# Patient Record
Sex: MALE
[De-identification: names, ages, dates, MRNs, and addresses within clinical notes are randomized per-mention and may not be internally consistent; named-entity substitution may affect disease eponyms.]

---

## 2022-11-17 LAB
PH UR: 6 [PH] (ref 5–8)
SP GR UR: 1 (ref 1–1.03)
UROBILINOGEN UR QL STRIP: <2 MG/DL (ref ?–2)

## 2022-11-17 RX ADMIN — FAMOTIDINE SCH MG: 20 TABLET, FILM COATED ORAL at 20:02

## 2022-11-17 RX ADMIN — LORAZEPAM PRN MG: 2 INJECTION INTRAMUSCULAR; INTRAVENOUS at 21:25

## 2022-11-17 RX ADMIN — DIVALPROEX SODIUM SCH MG: 500 TABLET, FILM COATED, EXTENDED RELEASE ORAL at 20:01

## 2022-11-18 RX ADMIN — NICOTINE SCH PATCH: 14 PATCH, EXTENDED RELEASE TRANSDERMAL at 08:44

## 2022-11-18 RX ADMIN — FAMOTIDINE SCH MG: 20 TABLET, FILM COATED ORAL at 19:53

## 2022-11-18 RX ADMIN — DIVALPROEX SODIUM SCH MG: 500 TABLET, FILM COATED, EXTENDED RELEASE ORAL at 19:53

## 2022-11-18 RX ADMIN — LORAZEPAM PRN MG: 2 INJECTION INTRAMUSCULAR; INTRAVENOUS at 15:47

## 2022-11-18 NOTE — P.HPIM
History of Present Illness


H&P Date: 11/18/22








History of Presenting Illness:





Patient is a very pleasant 42-year-old male with a past medical history of 

nicotine dependence, schizoaffective disorder, bipolar disorder, and cannabis 

use.  He is currently admitted to the inpatient mental health unit for 

stabilization of psychiatric symptoms resulting from manic episode.  We have 

been consulted for medical management for outpatient hospitalization.  Patient 

very agitated and exhibiting manic behaviors throughout examination.  Physical 

exam and review of systems is limited secondary to patient's yelling, outbursts,

and angry affect.  Patient did per minute physical exam of listening to her 

heart and lungs and then became irate and yelling and nursing staff intervening 

at this time.





Review of systems:





Pertinent positives and negatives as discussed in HPI, a complete review of 

systems was performed and all other systems are negative.





Physical exam:





Vital signs reviewed and stable. 


General: Nontoxic, no distress and appears stated age.  


Derm: Skin warm and dry, normal coloration for ethnicity.


Head: Atraumatic, normocephalic and symmetric.  


Eyes: EOMs intact, no lid lag, and anicteric sclera


Cardiovascular: regular rate and rhythm with normal S1S2, no murmur,


Lungs: Respirations even, regular, and unlabored on room air. Lungs CTA 

bilaterally, no rhonchi, no rales, no wheezing, and no accessory muscle usage. 


Abdominal: soft, nontender to palpation, no guarding, no appreciable 

organomegaly


Ext: ROM intact. Ambulatory with a steady gait. No lower extremity swelling.


Neuro: Rapid speech


Psych: Alert and oriented, aggressive behaviors and angry affect





Assessment and Plan of Care:





Polysubstance use


-Recommend smoking cessation as well as cessation of all cannabinoid use and 

alcohol use.





Manic episode


Schizoaffective disorder


Bipolar disorder


-Management per primary admitting psychiatric team.


-Maintain safe and supportive care.





Mitch Franco NP rendered care for this patient independently, reviewed the 

findings and plan as documented in the note above.  I did not physically speak 

with or examine the patient on this date. 





Thank you for allowing us to participate in the care of this pleasant patient.  

Do not hesitate to contact us with questions.  Someone can be reached from the 

[facility] hospitalist group all hours of the day at [phone] or via 

[employer].





Past Medical History


Past Medical History: No Reported History


History of Any Multi-Drug Resistant Organisms: None Reported


Past Surgical History: No Surgical Hx Reported


Past Anesthesia/Blood Transfusion Reactions: No Reported Reaction


Past Psychological History: Bipolar


Smoking Status: Current some day smoker


Past Drug Use History: None Reported





Medications and Allergies


                                    Allergies











Allergy/AdvReac Type Severity Reaction Status Date / Time


 


No Known Allergies Allergy   Verified 11/17/22 16:04














Physical Exam


Osteopathic Statement: *.  No significant issues noted on an osteopathic s

tructural exam other than those noted in the History and Physical/Consult.
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
## 2022-11-19 RX ADMIN — NICOTINE SCH PATCH: 14 PATCH, EXTENDED RELEASE TRANSDERMAL at 09:05

## 2022-11-19 RX ADMIN — FAMOTIDINE SCH MG: 20 TABLET, FILM COATED ORAL at 19:54

## 2022-11-19 RX ADMIN — DIVALPROEX SODIUM SCH MG: 500 TABLET, FILM COATED, EXTENDED RELEASE ORAL at 19:53

## 2022-11-19 NOTE — P.PN
Progress Note - Text


Progress Note Date: 11/19/22





Interval History:


Patient was seen wandering the hallways and was repeatedly approaching this 

provider with flight of ideas.  He is disorganized as he describes that there is

a medication change would like made.  Discussed increasing Risperdal and 

Seroquel to help him with sleep.  He was initially agreeable with this but 

approached this provider again with the same concern.  After it was clarified 

that the changes previously discussed were made, patient then began speaking 

about his outpatient provider.  This time, patient continues to be disorganized.

 At this time patient denies any suicidal or homical ideations, intent or plan. 

Patient denies any auditory, visual hallucinations and denies any paranoia or 

delusions. Patient denies any side effects from the medications and has been 

compliant with meds. 





Mental Status Exam:


General Appearance: Patient appears to be stated age is alert, however not 

directable or cooperative. Patient appears to have fair hygiene and grooming.


Behavior: Patient displays psychomotor agitation. Intense eye contact


Speech: Patient's speech is pressured, spontaneous, loud in volume.


Mood/Affect: Patient reports their mood is angry, affect is congruent and 

agitated and expansive


Suicidality/Homicidality:  Patient denies having any homicidal ideation intent 

or plan. Denies any suicidal ideations intent or plan  


Perceptions: Patient denies any visual hallucinations and denies any auditory 

hallucinations


Though content/process:  Thought process with flight of ideas. Denies SI and HI


Memory and concentration: AOX3, grossly intact for the purposes of this session.

 Concentration is grossly poor.


Judgment and insight: Grossly poor.





Assessment


Schizoaffective disorder, bipolar type





Plan:


-Patient is admitted under involuntary status to [facility] for stabilization of 

psychiatric symptoms and safety.  A second certification was completed and along

with petition will be filed for court.


-Medications : 


Risperdal increase to 2 mg daily and 3 mg by mouth twice a day for mood 

stabilization/psychosis. Additional 1 mg PO given once during daytime for 

agitation


Increase Seroquel to 400 mg by mouth at bedtime for now for sleep.  Likely taper

off this medication as her transition the patient to Risperdal perseris or 

invega sustenna


Depakote ER 1500 mg by mouth at bedtime for mood stabilization


-Ativan and Thorazine PRN for agitation/aggression


-Patient was informed of the risks, benefits and side effects of the medication 

however states that he will refuse medications.


-Internal Medicine consult to perform medical evaluation and physical.


-NRT - nicotine patch


-SW on board for discharge planning. Encourage patient to participate in groups 

to work on coping skills.

## 2022-11-20 RX ADMIN — NICOTINE SCH: 14 PATCH, EXTENDED RELEASE TRANSDERMAL at 08:20

## 2022-11-20 RX ADMIN — FAMOTIDINE SCH MG: 20 TABLET, FILM COATED ORAL at 20:39

## 2022-11-20 RX ADMIN — LORAZEPAM PRN MG: 2 INJECTION INTRAMUSCULAR; INTRAVENOUS at 03:07

## 2022-11-20 RX ADMIN — DIVALPROEX SODIUM SCH MG: 500 TABLET, FILM COATED, EXTENDED RELEASE ORAL at 20:39

## 2022-11-20 NOTE — P.PN
Progress Note - Text


Progress Note Date: 11/20/22





Interval History:


Patient was seen wandering the hallways and was repeatedly approaching this pr

ovider and staff.  He states that he had an outpatient provider who looks like 

this provider and he would like to know her name and information.  However, 

patient is unable to recall further details of this outpatient provider.  He 

reports tolerating his medications but requests that he be placed on Wellbutrin 

and that he would like to receive a "giant dose of Ambien".  He states "don't 

hold back" and requests for large doses for sleeping.  However, he reports that 

he has been sleeping well with the current medications.  Patient is more 

redirectable today but continues to be disorganized.  At this time patient 

denies any suicidal or homicidal ideations, intent or plan. Patient denies any 

auditory, visual hallucinations and denies any paranoia or delusions. Patient 

denies any side effects from the medications and has been compliant with meds. 





Mental Status Exam:


General Appearance: Patient appears to be stated age is alert, more cooperative.

Patient appears to have poor hygiene and grooming.


Behavior: Patient displays psychomotor agitation and intrusiveness


Speech: Patient's speech is pressured, spontaneous, loud in volume.


Mood/Affect: Patient reports their mood is "fine", affect is constricted


Suicidality/Homicidality:  Patient denies having any homicidal ideation intent 

or plan. Denies any suicidal ideations intent or plan  


Perceptions: Patient denies any visual hallucinations and denies any auditory 

hallucinations


Though content/process:  Thought process with flight of ideas. Denies SI and HI


Memory and concentration: AOX3, grossly intact for the purposes of this session.

 Concentration is grossly poor.


Judgment and insight: Grossly poor.





Assessment


Schizoaffective disorder, bipolar type





Plan:


-Patient is admitted under involuntary status to [facility] for stabilization of 

psychiatric symptoms and safety.  A second certification was completed and along

with petition will be filed for court.


-Medications : 


Risperdal increase to 3 mg by mouth twice a day for mood st

abilization/psychosis. 


Continue Seroquel 400 mg by mouth at bedtime for now for sleep.  Likely taper 

off this medication as her transition the patient to Risperdal perseris or 

invega sustenna


Depakote ER 1500 mg by mouth at bedtime for mood stabilization


-Ativan and Thorazine PRN for agitation/aggression


-Patient was informed of the risks, benefits and side effects of the medication 

however states that he will refuse medications.


-Internal Medicine consult to perform medical evaluation and physical.


-NRT - nicotine patch


-SW on board for discharge planning. Encourage patient to participate in groups 

to work on coping skills.

## 2022-11-21 LAB
ALBUMIN SERPL-MCNC: 3.7 G/DL (ref 3.5–5)
ALP SERPL-CCNC: 71 U/L (ref 38–126)
ALT SERPL-CCNC: 17 U/L (ref 4–49)
ANION GAP SERPL CALC-SCNC: 7 MMOL/L
AST SERPL-CCNC: 24 U/L (ref 17–59)
BASOPHILS # BLD AUTO: 0 K/UL (ref 0–0.2)
BASOPHILS NFR BLD AUTO: 1 %
BUN SERPL-SCNC: 10 MG/DL (ref 9–20)
CALCIUM SPEC-MCNC: 9.2 MG/DL (ref 8.4–10.2)
CHLORIDE SERPL-SCNC: 103 MMOL/L (ref 98–107)
CO2 SERPL-SCNC: 27 MMOL/L (ref 22–30)
EOSINOPHIL # BLD AUTO: 0.3 K/UL (ref 0–0.7)
EOSINOPHIL NFR BLD AUTO: 8 %
ERYTHROCYTE [DISTWIDTH] IN BLOOD BY AUTOMATED COUNT: 4.29 M/UL (ref 4.3–5.9)
ERYTHROCYTE [DISTWIDTH] IN BLOOD: 13.8 % (ref 11.5–15.5)
GLUCOSE SERPL-MCNC: 105 MG/DL (ref 74–99)
HCT VFR BLD AUTO: 38.8 % (ref 39–53)
HGB BLD-MCNC: 12.9 GM/DL (ref 13–17.5)
LYMPHOCYTES # SPEC AUTO: 1.2 K/UL (ref 1–4.8)
LYMPHOCYTES NFR SPEC AUTO: 31 %
MCH RBC QN AUTO: 30 PG (ref 25–35)
MCHC RBC AUTO-ENTMCNC: 33.2 G/DL (ref 31–37)
MCV RBC AUTO: 90.3 FL (ref 80–100)
MONOCYTES # BLD AUTO: 0.4 K/UL (ref 0–1)
MONOCYTES NFR BLD AUTO: 11 %
NEUTROPHILS # BLD AUTO: 1.7 K/UL (ref 1.3–7.7)
NEUTROPHILS NFR BLD AUTO: 46 %
PLATELET # BLD AUTO: 196 K/UL (ref 150–450)
POTASSIUM SERPL-SCNC: 4.3 MMOL/L (ref 3.5–5.1)
PROT SERPL-MCNC: 6.2 G/DL (ref 6.3–8.2)
SODIUM SERPL-SCNC: 137 MMOL/L (ref 137–145)
WBC # BLD AUTO: 3.8 K/UL (ref 3.8–10.6)

## 2022-11-21 RX ADMIN — NICOTINE SCH: 14 PATCH, EXTENDED RELEASE TRANSDERMAL at 09:15

## 2022-11-21 RX ADMIN — FAMOTIDINE SCH MG: 20 TABLET, FILM COATED ORAL at 20:04

## 2022-11-21 RX ADMIN — DIVALPROEX SODIUM SCH MG: 500 TABLET, FILM COATED, EXTENDED RELEASE ORAL at 20:03
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
## 2022-11-22 RX ADMIN — LITHIUM CARBONATE SCH MG: 450 TABLET, EXTENDED RELEASE ORAL at 20:07

## 2022-11-22 RX ADMIN — DIVALPROEX SODIUM SCH MG: 500 TABLET, FILM COATED, EXTENDED RELEASE ORAL at 20:07

## 2022-11-22 RX ADMIN — NICOTINE SCH PATCH: 14 PATCH, EXTENDED RELEASE TRANSDERMAL at 09:43

## 2022-11-22 RX ADMIN — FAMOTIDINE SCH MG: 20 TABLET, FILM COATED ORAL at 20:07
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
## 2022-11-23 RX ADMIN — FAMOTIDINE SCH MG: 20 TABLET, FILM COATED ORAL at 21:40

## 2022-11-23 RX ADMIN — LITHIUM CARBONATE SCH MG: 450 TABLET, EXTENDED RELEASE ORAL at 10:53

## 2022-11-23 RX ADMIN — LORAZEPAM PRN MG: 2 INJECTION INTRAMUSCULAR; INTRAVENOUS at 00:40

## 2022-11-23 RX ADMIN — DIVALPROEX SODIUM SCH MG: 500 TABLET, FILM COATED, EXTENDED RELEASE ORAL at 21:40

## 2022-11-23 RX ADMIN — LITHIUM CARBONATE SCH MG: 450 TABLET, EXTENDED RELEASE ORAL at 21:39

## 2022-11-23 RX ADMIN — NICOTINE SCH: 14 PATCH, EXTENDED RELEASE TRANSDERMAL at 10:53

## 2022-11-23 NOTE — P.PN
Progress Note - Text


Progress Note Date: 11/23/22











Interval History:


Patient was seen resting in bed and was directable and agreeable to speak with 

writer in his room.  Patient is reporting that he was able to sleep well.  He is

currently denying any suicidal or homicidal ideation, intention, and/or plan.  

He is not reporting any auditory or visual hallucinations.  The patient however 

continues to be overtly paranoid.  Every time the conversation discusses his 

length of stay as well as his mental health court status, the patient becomes 

very paranoid and states to this provider that the treatment team is "hiding" 

his  from him.  The patient is then able to calm down.  The patient is 

currently denying any racing thoughts, grandiosity, or increased goal-directed 

activity.  He deferred mental health court today.





Mental Status Exam:


General Appearance: Patient appears to be stated age and is directable and 

cooperative


Behavior: Patient was initially calmly seated in the office however and display 

psychomotor agitation 


Speech: Patient's speech is with normal rate and volume however becomes 

pressured and loud when he talks about court.


Mood/Affect: Mood is initially calm and then irritable  Affect is labile and 

expansive.


Suicidality/Homicidality:  Patient denies having any suicidal or homicidal idea

tion intent or plan.  


Perceptions: Patient denies any visual hallucinations and denies any auditory 

hallucinations  


Though content/process: Disorganized.  Flight of ideas.


Memory and concentration: Poor at this time.


Judgment and insight: Poor


                                   Vital Signs











Temp  97.7 F   11/22/22 06:19


 


Pulse  120 H  11/23/22 10:54


 


Resp  16   11/23/22 10:54


 


BP  126/66   11/23/22 10:54


 


Pulse Ox  99   11/22/22 15:32


 


FiO2      

















Assessment


Schizoaffective disorder, bipolar type





Plan:


-Patient continues to meet criteria for inpatient psychiatric admission for symp

alisa stabilization and safety.  Patient has been petitioned and certified.  He is

scheduled for a full court hearing on 11/29/2022.  Patient deferred mental 

health court.


-Medications: 


Risperdal 3 mg by mouth twice a day for mood stabilization/psychosis.  Likely 

transition the patient to Invega Sustenna or Risperdal Consta tomorrow.


Continue Seroquel 200 mg by mouth at bedtime due to tachycardia.


Continue 450 mg by mouth twice a day for mood stabilization


Depakote ER 1500 mg by mouth at bedtime. 


-EKG ordered due to tachycardia


-When necessary Ativan and Thorazine for agitation/aggression.


-NRT - nicotine patch


-SW on board for discharge planning.  Encouraged the patient to participate in 

milieu.

## 2022-11-24 RX ADMIN — NICOTINE SCH: 14 PATCH, EXTENDED RELEASE TRANSDERMAL at 08:08

## 2022-11-24 RX ADMIN — LITHIUM CARBONATE SCH MG: 450 TABLET, EXTENDED RELEASE ORAL at 20:32

## 2022-11-24 RX ADMIN — DIVALPROEX SODIUM SCH MG: 500 TABLET, FILM COATED, EXTENDED RELEASE ORAL at 20:04

## 2022-11-24 RX ADMIN — FAMOTIDINE SCH MG: 20 TABLET, FILM COATED ORAL at 20:05

## 2022-11-24 RX ADMIN — LITHIUM CARBONATE SCH MG: 450 TABLET, EXTENDED RELEASE ORAL at 08:08

## 2022-11-24 NOTE — P.PN
Progress Note - Text


Progress Note Date: 11/24/22











Interval History:


Patient was seen resting in bed and was directable and agreeable to speak with 

writer in his room.  Patient is currently denying any suicidal or homicidal 

ideation, intention, and/or plan.  He is not reporting any auditory or visual 

hallucinations.  He denies any paranoia or other delusions.  The patient is in 

agreement to receiving Invega Sustenna today.  He is not reporting any issues 

regarding his sleep or his appetite.  He has been in adherent with his 

medication is not reporting any significant side effects. He denies any chest 

pain, shortness of breath, or headache.





Mental Status Exam:


General Appearance: Patient appears to be stated age and is directable and 

cooperative


Behavior: Patient was calmly lying down in bed without any agitated behavior.


Speech: Patient's speech is with normal rate and volume.


Mood/Affect: Mood is "doing okay."  Affect is calm and euthymic.


Suicidality/Homicidality:  Patient denies having any suicidal or homicidal 

ideation intent or plan.  


Perceptions: Patient denies any visual hallucinations and denies any auditory 

hallucinations  


Though content/process: Linear and logical in short conversation.


Memory and concentration: Fair at this time.


Judgment and insight: Mildly improving





                                   Vital Signs











Temp  97.7 F   11/22/22 06:19


 


Pulse  120 H  11/23/22 10:54


 


Resp  16   11/23/22 10:54


 


BP  126/66   11/23/22 10:54


 


Pulse Ox  99   11/22/22 15:32


 


FiO2      

















Assessment


Schizoaffective disorder, bipolar type





Plan:


-Patient continues to meet criteria for inpatient psychiatric admission for 

symptom stabilization and safety.  Patient has been petitioned and certified.  

He is scheduled for a full court hearing on 11/29/2022.  Patient deferred mental

health court.


-Medications: 


Invega Sustenna 234 mg IM today.  We will discontinue oral Risperdal.


Continue Seroquel 200 mg by mouth at bedtime.  We'll consider tapering of this 

medication should the patient continued to display improvement in mood 

stabilization.  


Lithium 450 mg by mouth twice a day for mood stabilization


Depakote ER 1500 mg by mouth at bedtime. 


-When necessary Ativan and Thorazine for agitation/aggression.


-NRT - nicotine patch


-SW on board for discharge planning.  Encouraged the patient to participate in 

milieu.

## 2022-11-25 RX ADMIN — NICOTINE SCH PATCH: 14 PATCH, EXTENDED RELEASE TRANSDERMAL at 09:33

## 2022-11-25 RX ADMIN — DIVALPROEX SODIUM SCH MG: 500 TABLET, FILM COATED, EXTENDED RELEASE ORAL at 20:50

## 2022-11-25 RX ADMIN — LITHIUM CARBONATE SCH MG: 450 TABLET, EXTENDED RELEASE ORAL at 20:50

## 2022-11-25 RX ADMIN — LITHIUM CARBONATE SCH MG: 450 TABLET, EXTENDED RELEASE ORAL at 09:34

## 2022-11-25 RX ADMIN — FAMOTIDINE SCH MG: 20 TABLET, FILM COATED ORAL at 20:50

## 2022-11-25 NOTE — P.PN
Progress Note - Text


Progress Note Date: 11/25/22





Interval History:


Patient was seen resting in bed and was directable and agreeable to speak with 

writer in his room.  Currently, the patient is not reporting any suicidal or 

homicidal ideation, intention, and/or plan.  He is not reporting any auditory or

visual hallucinations.  He is denying any paranoia or other delusions.  The 

patient has been adherent with his medication and is not endorsing any 

significant side effects at this time.  He reports no issues regarding his sleep

or his appetite.  The patient received Invega Sustenna yesterday and is 

tolerating the medication well.  He is calm and cooperative and inquiring about 

discharge.  He was informed of discharge would be on Monday.  He is in agreement

with this plan.





Mental Status Exam:


General Appearance: Patient appears to be stated age and is directable and 

cooperative


Behavior: Patient was calmly lying down in bed without any agitated behavior.


Speech: Patient's speech is with normal rate and volume.


Mood/Affect: Mood is "doing okay."  Affect is calm and euthymic.


Suicidality/Homicidality:  Patient denies having any suicidal or homicidal 

ideation intent or plan.  


Perceptions: Patient denies any visual hallucinations and denies any auditory 

hallucinations  


Though content/process: Linear and logical in short conversation.


Memory and concentration: Fair at this time.


Judgment and insight: Mildly improving





                                   Vital Signs











Temp  98.0 F   11/25/22 06:16


 


Pulse  68   11/25/22 06:16


 


Resp  18   11/25/22 06:16


 


BP  106/57   11/25/22 06:16


 


Pulse Ox  96   11/25/22 06:16


 


FiO2      














Assessment


Schizoaffective disorder, bipolar type





Plan:


-Patient continues to meet criteria for inpatient psychiatric admission for 

symptom stabilization and safety.  Patient has been petitioned and certified.  

He is scheduled for a full court hearing on 11/29/2022.  Patient deferred mental

health court.


-Medications: 


Invega Sustenna 234 mg IM was administered on 11/24/2022. Next dose of invega 

sustenna 156 mg IM to be administered in 4-7 days.


Taper Seroquel to 100 mg at bedtime.


Lithium 450 mg by mouth twice a day for mood stabilization.  Lithium level 

ordered for Sunday.


Depakote ER 1500 mg by mouth at bedtime for mood stabilization.


-EKG reviewed- Sinus tachycardia with QTc of 438 ms.


-When necessary Ativan and Thorazine for agitation/aggression.


-NRT - nicotine patch


-SW on board for discharge planning.  Encouraged the patient to participate in 

milieu.

## 2022-11-26 VITALS — RESPIRATION RATE: 16 BRPM

## 2022-11-26 RX ADMIN — NICOTINE SCH PATCH: 14 PATCH, EXTENDED RELEASE TRANSDERMAL at 09:02

## 2022-11-26 RX ADMIN — LITHIUM CARBONATE SCH MG: 450 TABLET, EXTENDED RELEASE ORAL at 09:02

## 2022-11-26 RX ADMIN — DIVALPROEX SODIUM SCH MG: 500 TABLET, FILM COATED, EXTENDED RELEASE ORAL at 20:50

## 2022-11-26 RX ADMIN — FAMOTIDINE SCH: 20 TABLET, FILM COATED ORAL at 21:14

## 2022-11-26 RX ADMIN — LITHIUM CARBONATE SCH MG: 450 TABLET, EXTENDED RELEASE ORAL at 20:51

## 2022-11-26 NOTE — P.PN
Progress Note - Text


Progress Note Date: 11/26/22





Interval History:


Patient was seen resting in bed and was directable and agreeable to speak with 

writer. Currently, the patient is not reporting any suicidal or homicidal 

ideation, intention, and/or plan.  He is not reporting any auditory or visual 

hallucinations.  He is denying any paranoia or other delusions.  The patient has

been adherent with his medication and is not endorsing any significant side 

effects at this time.  He reports no issues regarding his sleep or his appetite.

 He asks why he is seeing this provider answered of his weekday psychiatrist and

understood when it was explained to him.  He denied further concerns or 

questions.





Mental Status Exam:


General Appearance: Patient appears to be stated age and is directable and 

cooperative


Behavior: Patient was calmly lying down in bed without any agitated behavior.


Speech: Patient's speech is with normal rate and volume.


Mood/Affect: Mood is "okay."  Affect is calm and euthymic.


Suicidality/Homicidality:  Patient denies having any suicidal or homicidal 

ideation intent or plan.  


Perceptions: Patient denies any visual hallucinations and denies any auditory 

hallucinations  


Though content/process: Linear and logical in short conversation.


Memory and concentration: Fair at this time.


Judgment and insight: Mildly improving








Assessment


Schizoaffective disorder, bipolar type





Plan:


-Patient continues to meet criteria for inpatient psychiatric admission for 

symptom stabilization and safety.  Patient has been petitioned and certified.  

He is scheduled for a full court hearing on 11/29/2022.  Patient deferred mental

health court.


-Medications: 


Invega Sustenna 234 mg IM was administered on 11/24/2022. Next dose of invega 

sustenna 156 mg IM to be administered in 4-7 days.


Continue Seroquel 100 mg at bedtime for sleep


Lithium 450 mg by mouth twice a day for mood stabilization.  Lithium level or

dered for Sunday.


Depakote ER 1500 mg by mouth at bedtime for mood stabilization.


-EKG reviewed- Sinus tachycardia with QTc of 438 ms. HR improved yesterday and 

today and patient denying cardiac symps


-When necessary Ativan and Thorazine for agitation/aggression.


-NRT - nicotine patch


-SW on board for discharge planning.  Encouraged the patient to participate in 

milieu.

## 2022-11-27 VITALS — TEMPERATURE: 98.3 F | DIASTOLIC BLOOD PRESSURE: 59 MMHG | SYSTOLIC BLOOD PRESSURE: 115 MMHG | HEART RATE: 82 BPM

## 2022-11-27 RX ADMIN — FAMOTIDINE SCH MG: 20 TABLET, FILM COATED ORAL at 19:45

## 2022-11-27 RX ADMIN — NICOTINE SCH: 14 PATCH, EXTENDED RELEASE TRANSDERMAL at 12:00

## 2022-11-27 RX ADMIN — DIVALPROEX SODIUM SCH MG: 500 TABLET, FILM COATED, EXTENDED RELEASE ORAL at 19:45

## 2022-11-27 RX ADMIN — LITHIUM CARBONATE SCH MG: 450 TABLET, EXTENDED RELEASE ORAL at 12:00

## 2022-11-27 RX ADMIN — LITHIUM CARBONATE SCH MG: 450 TABLET, EXTENDED RELEASE ORAL at 19:45

## 2022-11-27 NOTE — P.PN
Progress Note - Text


Progress Note Date: 11/27/22





Interval History:


Patient was seen resting in bed and was directable and agreeable to speak with 

writer. Currently, the patient is not reporting any suicidal or homicidal 

ideation, intention, and/or plan.  He is not reporting any auditory or visual 

hallucinations.  He is denying any paranoia or other delusions.  The patient has

been adherent with his medication and is not endorsing any significant side 

effects at this time.  He is compliant with lithium draw this morning.  He 

reports no issues regarding his sleep or his appetite.  He reports that he is 

anticipating to be speaking with his provider again tomorrow regarding 

discharge.  He denied further concerns or questions.





Mental Status Exam:


General Appearance: Patient appears to be stated age and is directable and 

cooperative, somnolent this morning


Behavior: Patient was calmly lying down in bed without any agitated behavior.


Speech: Patient's speech is with normal rate and volume.


Mood/Affect: Mood is "okay."  Affect is calm and euthymic.


Suicidality/Homicidality:  Patient denies having any suicidal or homicidal 

ideation intent or plan.  


Perceptions: Patient denies any visual hallucinations and denies any auditory 

hallucinations  


Though content/process: Linear and logical in short conversation.


Memory and concentration: Fair at this time.


Judgment and insight: Mildly improving








Assessment


Schizoaffective disorder, bipolar type





Plan:


-Patient continues to meet criteria for inpatient psychiatric admission for 

symptom stabilization and safety.  Patient has been petitioned and certified.  

He is scheduled for a full court hearing on 11/29/2022.  Patient deferred mental

health court.


-Medications: 


Invega Sustenna 234 mg IM was administered on 11/24/2022. Next dose of invega 

sustenna 156 mg IM to be administered in 4-7 days.


Continue Seroquel 100 mg at bedtime for sleep


Lithium 450 mg by mouth twice a day for mood stabilization.  Lithium level on 

11/27: 0.8


Depakote ER 1500 mg by mouth at bedtime for mood stabilization. Valproic acid 

level 67.8 on 11/21


-EKG reviewed- Sinus tachycardia with QTc of 438 ms. HR improved yesterday and 

today and patient denying cardiac symps


-When necessary Ativan and Thorazine for agitation/aggression.


-NRT - nicotine patch


-SW on board for discharge planning.  Encouraged the patient to participate in 

milieu.

## 2022-11-28 ENCOUNTER — HOSPITAL ENCOUNTER (INPATIENT)
Dept: HOSPITAL 47 - 3MHU | Age: 42
Discharge: HOME | DRG: 885 | End: 2022-11-28
Attending: PSYCHIATRY & NEUROLOGY | Admitting: PSYCHIATRY & NEUROLOGY
Payer: MEDICAID

## 2022-11-28 VITALS — BODY MASS INDEX: 27.6 KG/M2

## 2022-11-28 DIAGNOSIS — Z71.41: ICD-10-CM

## 2022-11-28 DIAGNOSIS — Z71.51: ICD-10-CM

## 2022-11-28 DIAGNOSIS — F31.2: Primary | ICD-10-CM

## 2022-11-28 DIAGNOSIS — Z79.899: ICD-10-CM

## 2022-11-28 DIAGNOSIS — Z71.6: ICD-10-CM

## 2022-11-28 DIAGNOSIS — F17.210: ICD-10-CM

## 2022-11-28 PROCEDURE — 80164 ASSAY DIPROPYLACETIC ACD TOT: CPT

## 2022-11-28 PROCEDURE — 90677 PCV20 VACCINE IM: CPT

## 2022-11-28 PROCEDURE — 80053 COMPREHEN METABOLIC PANEL: CPT

## 2022-11-28 PROCEDURE — 80178 ASSAY OF LITHIUM: CPT

## 2022-11-28 PROCEDURE — 93005 ELECTROCARDIOGRAM TRACING: CPT

## 2022-11-28 PROCEDURE — 81003 URINALYSIS AUTO W/O SCOPE: CPT

## 2022-11-28 PROCEDURE — 85025 COMPLETE CBC W/AUTO DIFF WBC: CPT

## 2022-11-28 RX ADMIN — NICOTINE SCH PATCH: 14 PATCH, EXTENDED RELEASE TRANSDERMAL at 08:10

## 2022-11-28 RX ADMIN — LITHIUM CARBONATE SCH MG: 450 TABLET, EXTENDED RELEASE ORAL at 08:10

## 2022-11-28 NOTE — P.DS
Providers


Date of admission: 


11/17/22 15:25





Expected date of discharge: 11/28/22


Attending physician: 


Binu Stone MD





Consults: 





                                        





11/17/22 14:12


Consult Physician Routine 


   Consulting Provider: [facility]


   Consult Reason/Comments: Medical H&P


   Do you want consulting provider notified?: Yes











Primary care physician: 


Stated None








- Discharge Diagnosis(es)


(1) Severe manic bipolar 1 disorder with psychotic behavior


Current Visit: Yes   Status: Acute   Priority: High   





(2) Tobacco use disorder


Current Visit: Yes   Status: Chronic   Priority: Medium   


Hospital Course: 





Admission HPI:


Patient is a 42-year-old -American male with significant history of 

schizoaffective disorder bipolar type who presented from [facility] under 

petition and certification for psychotic and manic behavior.





Patient presented to the hospital on 11/17/2022, brought into the hospital from 

[facility] under petition and certification for acute psychotic and manic b

ehavior.  As per chart review from [facility], the patient was brought to the 

hospital by EMS and police officers due to delusional behavior and psychosis.  

He was noted to be delusional upon arrival.  He attempted to grab his mother's 

arms and was screaming incoherently at police officers.  He was adamant that [place] 

[place]  He was threatening to call 

his  and was screaming nonsensical things.  The patient was subsequently

admitted to our psychiatric unit.  Upon evaluation her psychiatric unit, the 

patient is minimally cooperative.  He reports that he does not want to speak 

with this provider or engage in any medication management until he receives 

coffee.  He was informed that to be only serve coffee during mealtime however 

the patient maintains this stance.  When finally interviewed, the patient 

maintains that he is brought to the hospital against his will and only because 

he is "the owner of all the homes and properties in [place], [place].  Police 

can't stand that a black man owns all the properties."  The patient is quite 

agitated and nonsensical in his speech.  No other history could be elicited at 

this time.





Patient has reported history of schizoaffective disorder and bipolar disorder.  

Previous medications prior to this admission included Seroquel and Depakote.





Hospital course:


Upon admission to the unit patient was initially presenting as overtly manic and

intrusive.  The patient initially refused any medications or treatment.  He was 

initially very difficult to direct.  He was started on a regimen of Risperdal, 

Seroquel, and Depakote for management of acute kirk.  The patient was 

eventually adherent with his medications.  He was agreeable to adding lithium to

his regimen.  On this regimen, the patient displayed significant improvement in 

regards to started symptoms of kirk and psychosis.  He was eventually 

transition to Invega Sustenna and his oral Risperdal was discontinued.  He 

received his second loading dose of Invega Sustenna on 11/28/2022.  He tolerated

the medications well and reported no significant side effects.  Prior to 

discharge, the patient did defer mental health court.  On the day of discharge, 

the patient is not reporting any suicidal or homicidal ideation, intention, 

and/or plan.  He is not reporting any auditory or visual hallucinations.  He is 

denying any paranoia or other delusions.  He has been adherent with his 

medication and is not endorsing any significant side effects.  The patient 

reported no issues regarding sleep or his appetite.  He reported no medical 

issues or concerns and denied any chest pain, shortness of breath, palpitations,

or dystonic reactions.  An EKG was performed and revealed sinus tachycardia with

a normal QTc interval.  The patient was counseled at length on the reports of 

medication adherence appropriate outpatient follow-up.  Furthermore, the patient

was counseled on abstaining from all substances including alcohol and marijuana.

 Prior to discharge, family meeting was arranged by  to answer any 

questions and ensure safety.





Mental status exam:


General Appearance: Patient appears to be stated age is alert, pleasant, and 

cooperative. Patient is in no acute distress and has fair hygiene and grooming 


Behavior: Patient is calmly seated without any agitated behavior.


Speech: Patient's speech is fluent and nonpressured. 


Mood/Affect: Patient reports their mood is "good", affect is congruent and 

euthymic. 


Suicidality/Homicidality:  Patient denies having any suicidal or homicidal 

ideation intent or plan.  


Perceptions: Patient denies any auditory or visual hallucinations.  


Though content/process: There is no evidence of any delusional thought content 

and thought process is linear and goal-directed.  Is future oriented


Memory and concentration: AOX3, grossly intact for the purposes of this session.

Can spell "WORLD" backwards correctly.


Judgment and insight: Improved with guarded prognosis





Impression:


Bipolar 1 disorder, manic episode


Tobacco use disorder





Plan:


-Continue with discharge today as patient has improved and stabilized 

psychiatrically and is not currently an imminent threat to himself and/or 

others.  Patient will remain at chronically elevated risk due to the severity of

his mental illness.


-Continue medications: 


Seroquel 100 mg by mouth at bedtime for 7 days.  Patient is currently on dual 

antipsychotic treatment with Invega and Seroquel.  We will begin to taper off 

Seroquel.


Invega Sustenna 156 mg IM was administered on 11/28/2022.  Next dose due in one 

month.


Depakote 1500 mg by mouth at bedtime for mood stabilization


Lithium 450 mg by mouth twice a day for mood stabilization


Habitrol patches for nicotine cessation


-Patient was counseled on the need for medication compliance and appropriate 

follow-up at mental health and also primary care for medical issues.  Patient 

verbalized understanding and agreed.


-Social work to arrange for and conduct family meeting to ensure safety upon di

scharge and answer any questions/concerns. Social work also to arrange for 

patients follow up appointments with [facility] for psychiatric care along with follow 

up with primary care provider.


-Patient counseled on abstaining from recreational drugs and marijuana and 

alcohol. Was informed/educated on the adverse effects on their physical and 

mental health. Patient verbally agreed and understood. 


-Patient was instructed to return to the hospital or seek immediate medical care

if their psychiatric or medical symptoms do worsen or reoccur.


-Psychoeducation and supportive therapy provided to patient.  Risks and benefits

of pharmacological treatment versus the risks and benefits of nontreatment 

weight and discussed.  Informed consent discussion held.  Common side effects of

psychotropics discussed such as, but not limited to headache, GI disturbance, 

sexual dysfunction, movement disorders, sedation, and orthostatic hypotension.  

Life threatening and blackbox warnings of prescribed medications also discussed.

 Potential risks of operating a vehicle or heavy machinery discussed with 

patient at length.  Advised on importance of compliance and a reliable and 

responsible manner. Patient advised to review FDA consumer labeling of all 

medications prior to taking.  Patient verbalized understanding of potential 

risks, and agrees with current treatment plan.  Patient advised to medically 

contact physician/emergency personnel if any acute changes in condition occur.








                                   Vital Signs











Temp  98.3 F   11/27/22 11:55


 


Pulse  82   11/27/22 11:55


 


Resp  16   11/27/22 11:55


 


BP  115/59   11/27/22 11:55


 


Pulse Ox  98   11/27/22 11:55


 


FiO2      








                                 Intake & Output











 11/27/22 11/28/22 11/28/22





 18:59 06:59 18:59


 


Weight 79.7 kg  79.7 kg











                               Laboratory Results











WBC  3.8 k/uL (3.8-10.6)   11/21/22  14:36    


 


RBC  4.29 m/uL (4.30-5.90)  L  11/21/22  14:36    


 


Hgb  12.9 gm/dL (13.0-17.5)  L  11/21/22  14:36    


 


Hct  38.8 % (39.0-53.0)  L  11/21/22  14:36    


 


MCV  90.3 fL (80.0-100.0)   11/21/22  14:36    


 


MCH  30.0 pg (25.0-35.0)   11/21/22  14:36    


 


MCHC  33.2 g/dL (31.0-37.0)   11/21/22  14:36    


 


RDW  13.8 % (11.5-15.5)   11/21/22  14:36    


 


Plt Count  196 k/uL (150-450)   11/21/22  14:36    


 


MPV  7.5   11/21/22  14:36    


 


Neutrophils %  46 %  11/21/22  14:36    


 


Lymphocytes %  31 %  11/21/22  14:36    


 


Monocytes %  11 %  11/21/22  14:36    


 


Eosinophils %  8 %  11/21/22  14:36    


 


Basophils %  1 %  11/21/22  14:36    


 


Neutrophils #  1.7 k/uL (1.3-7.7)   11/21/22  14:36    


 


Lymphocytes #  1.2 k/uL (1.0-4.8)   11/21/22  14:36    


 


Monocytes #  0.4 k/uL (0-1.0)   11/21/22  14:36    


 


Eosinophils #  0.3 k/uL (0-0.7)   11/21/22  14:36    


 


Basophils #  0.0 k/uL (0-0.2)   11/21/22  14:36    


 


Hypochromasia  Slight   11/21/22  14:36    


 


Sodium  137 mmol/L (137-145)   11/21/22  14:36    


 


Potassium  4.3 mmol/L (3.5-5.1)   11/21/22  14:36    


 


Chloride  103 mmol/L ()   11/21/22  14:36    


 


Carbon Dioxide  27 mmol/L (22-30)   11/21/22  14:36    


 


Anion Gap  7 mmol/L  11/21/22  14:36    


 


BUN  10 mg/dL (9-20)   11/21/22  14:36    


 


Creatinine  0.73 mg/dL (0.66-1.25)   11/21/22  14:36    


 


Est GFR (CKD-EPI)AfAm  >90  (>60 ml/min/1.73 sqM)   11/21/22  14:36    


 


Est GFR (CKD-EPI)NonAf  >90  (>60 ml/min/1.73 sqM)   11/21/22  14:36    


 


Glucose  105 mg/dL (74-99)  H  11/21/22  14:36    


 


Calcium  9.2 mg/dL (8.4-10.2)   11/21/22  14:36    


 


Total Bilirubin  <0.1 mg/dL (0.2-1.3)  L  11/21/22  14:36    


 


AST  24 U/L (17-59)   11/21/22  14:36    


 


ALT  17 U/L (4-49)   11/21/22  14:36    


 


Alkaline Phosphatase  71 U/L ()   11/21/22  14:36    


 


Total Protein  6.2 g/dL (6.3-8.2)  L  11/21/22  14:36    


 


Albumin  3.7 g/dL (3.5-5.0)   11/21/22  14:36    


 


Urine Color  Colorless   11/17/22  15:38    


 


Urine Appearance  Clear  (Clear)   11/17/22  15:38    


 


Urine pH  6.0  (5.0-8.0)   11/17/22  15:38    


 


Ur Specific Gravity  1.004  (1.001-1.035)   11/17/22  15:38    


 


Urine Protein  Negative  (Negative)   11/17/22  15:38    


 


Urine Glucose (UA)  Negative  (Negative)   11/17/22  15:38    


 


Urine Ketones  Negative  (Negative)   11/17/22  15:38    


 


Urine Blood  Negative  (Negative)   11/17/22  15:38    


 


Urine Nitrite  Negative  (Negative)   11/17/22  15:38    


 


Urine Bilirubin  Negative  (Negative)   11/17/22  15:38    


 


Urine Urobilinogen  <2.0 mg/dL (<2.0)   11/17/22  15:38    


 


Ur Leukocyte Esterase  Negative  (Negative)   11/17/22  15:38    


 


Valproic Acid  67.8 ug/mL  11/21/22  14:36    


 


Lithium  0.8 mmol/L  11/27/22  10:46    











                                    Allergies











Allergy/AdvReac Type Severity Reaction Status Date / Time


 


No Known Allergies Allergy   Verified 11/17/22 16:04











Patient Condition at Discharge: Stable





Plan - Discharge Summary


New Discharge Prescriptions: 


New


   RX: Nicotine 14Mg/24Hr Patch [Habitrol] 1 patch TRANSDERM DAILY 30 Days  

patch


   RX: Paliperidone IM [Invega Sustenna] 156 mg IM ONCE #1 ml


   RX: Divalproex ER [Depakote ER] 1,500 mg PO HS 30 Days  tab


   RX: Lithium Carbonate ER [Lithobid] 450 mg PO BID 30 Days  tab


   QUEtiapine [SEROquel] 100 mg PO HS 7 Days #7 tablet


Discharge Medication List





QUEtiapine [SEROquel] 100 mg PO HS 7 Days #7 tablet 11/28/22 [Rx]


RX: Divalproex ER [Depakote ER] 1,500 mg PO HS 30 Days  tab 11/28/22 [Rx]


RX: Lithium Carbonate ER [Lithobid] 450 mg PO BID 30 Days  tab 11/28/22 [Rx]


RX: Nicotine 14Mg/24Hr Patch [Habitrol] 1 patch TRANSDERM DAILY 30 Days  patch 

11/28/22 [Rx]


RX: Paliperidone IM [Invega Sustenna] 156 mg IM ONCE #1 ml 11/28/22 [Rx]








Follow up Appointment(s)/Referral(s): 


[facility] [Other] - 1 Week


Activity/Diet/Wound Care/Special Instructions: 


Avoid the use of street drugs and alcohol.  Take all prescriptions as 

prescribed.  When you are in need of refills on your medications, please contact

 your medical provider and/or outpatient psychiatrist to have this done.  Please

 go to scheduled outpatient appointment for aftercare treatment.  If symptoms 

return or become worse, call the crisis line at [phone] and/or go to the 

nearest emergency room for evaluation. 


Discharge Disposition: HOME SELF-CARE

## 2023-07-07 ENCOUNTER — HOSPITAL ENCOUNTER (INPATIENT)
Dept: HOSPITAL 47 - 3MHU | Age: 43
LOS: 5 days | Discharge: HOME | DRG: 750 | End: 2023-07-12
Attending: PSYCHIATRY & NEUROLOGY | Admitting: PSYCHIATRY & NEUROLOGY
Payer: MEDICAID

## 2023-07-07 DIAGNOSIS — Z71.6: ICD-10-CM

## 2023-07-07 DIAGNOSIS — F12.10: ICD-10-CM

## 2023-07-07 DIAGNOSIS — M54.50: ICD-10-CM

## 2023-07-07 DIAGNOSIS — Z28.310: ICD-10-CM

## 2023-07-07 DIAGNOSIS — Z71.41: ICD-10-CM

## 2023-07-07 DIAGNOSIS — F25.0: Primary | ICD-10-CM

## 2023-07-07 DIAGNOSIS — F17.210: ICD-10-CM

## 2023-07-07 DIAGNOSIS — Z79.899: ICD-10-CM

## 2023-07-07 DIAGNOSIS — G89.29: ICD-10-CM

## 2023-07-07 DIAGNOSIS — Z71.51: ICD-10-CM

## 2023-07-07 DIAGNOSIS — G47.00: ICD-10-CM

## 2023-07-07 PROCEDURE — 80164 ASSAY DIPROPYLACETIC ACD TOT: CPT

## 2023-07-07 PROCEDURE — 80178 ASSAY OF LITHIUM: CPT

## 2023-07-07 PROCEDURE — 80053 COMPREHEN METABOLIC PANEL: CPT

## 2023-07-07 PROCEDURE — 84443 ASSAY THYROID STIM HORMONE: CPT

## 2023-07-07 PROCEDURE — 83036 HEMOGLOBIN GLYCOSYLATED A1C: CPT

## 2023-07-07 PROCEDURE — 80061 LIPID PANEL: CPT

## 2023-07-07 RX ADMIN — LITHIUM CARBONATE SCH MG: 450 TABLET, EXTENDED RELEASE ORAL at 19:59

## 2023-07-07 RX ADMIN — FAMOTIDINE SCH MG: 20 TABLET, FILM COATED ORAL at 19:59

## 2023-07-08 LAB
CHOLEST SERPL-MCNC: 145 MG/DL (ref 0–200)
HDLC SERPL-MCNC: 38.3 MG/DL (ref 40–60)
LDLC SERPL CALC-MCNC: 62.1 MG/DL (ref 0–131)
LITHIUM SERPL-MCNC: 0.7 MMOL/L
TRIGL SERPL-MCNC: 223 MG/DL (ref 0–149)
VLDLC SERPL CALC-MCNC: 44.6 MG/DL (ref 5–40)

## 2023-07-08 RX ADMIN — LITHIUM CARBONATE SCH MG: 450 TABLET, EXTENDED RELEASE ORAL at 08:38

## 2023-07-08 RX ADMIN — NICOTINE SCH PATCH: 14 PATCH, EXTENDED RELEASE TRANSDERMAL at 08:38

## 2023-07-08 RX ADMIN — FAMOTIDINE SCH MG: 20 TABLET, FILM COATED ORAL at 20:04

## 2023-07-08 RX ADMIN — LITHIUM CARBONATE SCH MG: 450 TABLET, EXTENDED RELEASE ORAL at 20:04
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
## 2023-07-09 RX ADMIN — FAMOTIDINE SCH MG: 20 TABLET, FILM COATED ORAL at 19:18

## 2023-07-09 RX ADMIN — LITHIUM CARBONATE SCH MG: 450 TABLET, EXTENDED RELEASE ORAL at 19:21

## 2023-07-09 RX ADMIN — NICOTINE SCH PATCH: 14 PATCH, EXTENDED RELEASE TRANSDERMAL at 07:56

## 2023-07-09 RX ADMIN — LITHIUM CARBONATE SCH MG: 450 TABLET, EXTENDED RELEASE ORAL at 07:53

## 2023-07-09 RX ADMIN — DIVALPROEX SODIUM SCH MG: 500 TABLET, FILM COATED, EXTENDED RELEASE ORAL at 19:27

## 2023-07-09 NOTE — P.PN
Progress Note - Text





Interval history: 


Patient was seen and was directable and agreeable to speak with writer.  States 

that he is doing "good" and that he slept well. At this time patient denies any 

suicidal or homicidal ideations intent or plan. Denies any Auditory or visual 

hallucinations. Patient denies any side effects from the medications and has 

been compliant with meds. 





Mental status exam: 


General Appearance: Lying in bed, dressed in hospital robe


Behavior: [No agitated behavior. Patient is calm and directable]


Speech: Patient's speech is fluent and nonpressured. 


Mood/Affect: Mood is "good", affect is constricted. 


Suicidality/Homicidality:  Patient denies having any suicidal or homicidal 

ideation intent or plan.  


Perceptions: Patient denies any auditory or visual hallucinations.  


Though content/process: [There is no evidence of any delusional thought content 

and thought process is linear and goal-directed.] 


Memory and concentration: AOX3, grossly intact for the purposes of this session


Judgment and insight: improving mildly





Assessment/Plan: Continue with current diagnosis. Patient continues to meet 

criteria for inpatient psychiatric admission for symptom stabilization and 

safety.[Patient will be maintained on current psychotropic medication regimen.] 

Monitor for medication compliance and for any psychotropic medication side 

effects. Will continue to monitor ongoing response to treatment.  Encouraged 

participation in milieu.

## 2023-07-10 RX ADMIN — FAMOTIDINE SCH MG: 20 TABLET, FILM COATED ORAL at 19:56

## 2023-07-10 RX ADMIN — LITHIUM CARBONATE SCH MG: 450 TABLET, EXTENDED RELEASE ORAL at 19:56

## 2023-07-10 RX ADMIN — LITHIUM CARBONATE SCH MG: 450 TABLET, EXTENDED RELEASE ORAL at 09:53

## 2023-07-10 RX ADMIN — NICOTINE SCH PATCH: 14 PATCH, EXTENDED RELEASE TRANSDERMAL at 09:53

## 2023-07-10 RX ADMIN — DIVALPROEX SODIUM SCH MG: 500 TABLET, FILM COATED, EXTENDED RELEASE ORAL at 19:56

## 2023-07-10 NOTE — P.PN
Progress Note - Text


Progress Note Date: 07/10/23





Interval History:


Patient was seen resting in bed and was directable and agreeable to speak with 

writer in his room.  Currently, the patient is not reporting any suicidal or 

homicidal ideation, intention, and/or plan.  He is not reporting any auditory or

visual hallucinations.  He is denying any paranoia or other delusions.  He does 

mention concern that his mom has been stealing money that belongs to him.  He 

otherwise does not endorse any other delusional thought content.  He denies any 

thought insertion, thought projection, paranoia, or other delusional themes.  He

has been adherent with his medication and is not reporting any overt side 

effects however appears to be grossly sedated at this time.  The patient is 

arousable however falls back asleep after a few seconds or after answering each 

question.





Mental Status Exam:


General Appearance: Patient appears to be stated age and is very somnolent, 

appears to be drooling, however directable and cooperative. 


Behavior: Patient is calmly sleeping in bed without any agitated behavior.


Speech: Patient's speech is nonspontaneous, low in volume.


Mood/Affect: Mood is "okay," affect is somnolent and sedated


Suicidality/Homicidality: Patient does not report any suicidal or homicidal 

ideation, intention, and/or plan.


Perceptions: Patient denies any visual hallucinations and denies any auditory 

hallucinations  


Though content/process: Paranoia towards mother stealing money.


Memory and concentration: AOX3, grossly intact for the purposes of this session


Judgment and insight: Improving mildly





                                   Vital Signs











Temp  97.9 F   07/08/23 23:57


 


Pulse  122 H  07/08/23 23:57


 


Resp  17   07/08/23 23:57


 


BP  127/73   07/08/23 23:57


 


Pulse Ox  99   07/08/23 23:57


 


FiO2      








                                 Intake & Output











 07/09/23 07/10/23 07/10/23





 18:59 06:59 18:59


 


Weight 89.5 kg  








                               Laboratory Results











Estimated Ave Glu mg/dL  123 mg/dL  07/08/23  10:42    


 


Hemoglobin A1c  5.9 % (<=6.0)   07/08/23  10:42    


 


Triglycerides  223.00 mg/dL (0..00)  H  07/08/23  10:42    


 


Cholesterol  145.00 mg/dL (0..00)   07/08/23  10:42    


 


LDL Cholesterol, Calc  62.1 mg/dL (0.0-131.0)   07/08/23  10:42    


 


VLDL Cholesterol, Calc  44.60 mg/dL (5.00-40.00)  H  07/08/23  10:42    


 


HDL Cholesterol  38.30 mg/dL (40.00-60.00)  L  07/08/23  10:42    


 


Cholesterol/HDL Ratio  3.79 Ratio  07/08/23  10:42    


 


TSH  3.250 mIU/L (0.465-4.680)   07/08/23  10:42    


 


Lithium  0.7 mmol/L  07/08/23  10:42    














Assessment


Schizoaffective disorder, bipolar type





Plan:


-Patient continues to meet criteria for inpatient psychiatric admission for 

symptom stabilization and safety. Patient has signed adult voluntary form and 

medication consent and was placed in patient's chart.


-Patient is currently very sedated and is presenting with unintentional harm to 

self should he be discharged in this state.


-Medications: 


Due to sedation, we will change Seroquel to 400 mg by mouth at bedtime and 

discontinue morning Seroquel


Continue Depakote 1500 mg by mouth at bedtime for mood stabilization


Decrease Klonopin to 1 mg by mouth at bedtime for anxiety/insomnia


Continue lithium 450 mg by mouth twice a day for stabilization


-When necessary Ativan and Haldol for agitation/aggression.


-NRT - nicotine patch


-SW on board for discharge planning.  Encouraged the patient to participate in 

milieu.

## 2023-07-11 VITALS — HEART RATE: 106 BPM

## 2023-07-11 LAB
ALBUMIN SERPL-MCNC: 3.8 G/DL (ref 3.5–5)
ALP SERPL-CCNC: 71 U/L (ref 38–126)
ALT SERPL-CCNC: 29 U/L (ref 4–49)
ANION GAP SERPL CALC-SCNC: 6 MMOL/L
AST SERPL-CCNC: 26 U/L (ref 17–59)
BUN SERPL-SCNC: 9 MG/DL (ref 9–20)
CALCIUM SPEC-MCNC: 9.3 MG/DL (ref 8.4–10.2)
CHLORIDE SERPL-SCNC: 103 MMOL/L (ref 98–107)
CO2 SERPL-SCNC: 28 MMOL/L (ref 22–30)
GLUCOSE SERPL-MCNC: 92 MG/DL (ref 74–99)
POTASSIUM SERPL-SCNC: 4.2 MMOL/L (ref 3.5–5.1)
PROT SERPL-MCNC: 6.7 G/DL (ref 6.3–8.2)
SODIUM SERPL-SCNC: 137 MMOL/L (ref 137–145)

## 2023-07-11 RX ADMIN — LITHIUM CARBONATE SCH MG: 450 TABLET, EXTENDED RELEASE ORAL at 08:35

## 2023-07-11 RX ADMIN — NICOTINE SCH PATCH: 14 PATCH, EXTENDED RELEASE TRANSDERMAL at 08:35

## 2023-07-11 RX ADMIN — LITHIUM CARBONATE SCH MG: 450 TABLET, EXTENDED RELEASE ORAL at 20:18

## 2023-07-11 RX ADMIN — FAMOTIDINE SCH MG: 20 TABLET, FILM COATED ORAL at 20:18

## 2023-07-11 RX ADMIN — DIVALPROEX SODIUM SCH MG: 500 TABLET, FILM COATED, EXTENDED RELEASE ORAL at 20:18

## 2023-07-11 NOTE — P.PN
Progress Note - Text


Progress Note Date: 07/11/23





Interval History:


Patient was seen resting in bed and was directable and agreeable to speak with 

writer in his room.  Currently, the patient is not reporting any suicidal or 

homicidal ideation, intention, and/or plan.  He is not reporting any auditory or

visual hallucinations.  The patient was looking for to being discharged today.  

However, his mother is his guardian and apparently she will be out of town until

tomorrow.  The patient becomes quite angry and vehemently states that he has no 

guardian and that his mother has been constantly acting against him.  He then 

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 

to [place]ika using both [place] and [place] law if he is not discharged today.  

He otherwise has been in adherent with his medications and has been cooperative 

and polite with staff.  He has been directable.  He has been sleeping well.





Mental Status Exam:


General Appearance: Patient appears to be stated age, is alert, with fair 

hygiene and grooming.


Behavior: Patient is initially calmly seated however becomes upset when informed

that he would not be discharged as his mother who is his guardian is currently 

not ready to receive him at the home.


Speech: Patient's speech is spontaneous, hyperverbal, pressured.


Mood/Affect: Mood is "I need to be discharged today," affect is irritable and 

demanding


Suicidality/Homicidality: Patient does not report any suicidal or homicidal 

ideation, intention, and/or plan.


Perceptions: Patient denies any visual hallucinations and denies any auditory 

hallucinations  


Though content/process: Paranoia towards mother stealing money.  Grandiose 

delusions endorse.


Memory and concentration: AOX3, grossly intact for the purposes of this session


Judgment and insight: Poor





                                   Vital Signs











Temp  98.6 F   07/10/23 23:00


 


Pulse  106 H  07/10/23 23:00


 


Resp  19   07/10/23 23:00


 


BP  122/75   07/10/23 23:00


 


Pulse Ox  98   07/10/23 23:00


 


FiO2      














Assessment


Schizoaffective disorder, bipolar type





Plan:


-Patient continues to meet criteria for inpatient psychiatric admission for 

symptom stabilization and safety. Patient has signed adult voluntary form and 

medication consent and was placed in patient's chart.


-Medications: 


Continue Seroquel 400 mg by mouth at bedtime


Continue Depakote 1500 mg by mouth at bedtime for mood stabilization.  Depakote 

level ordered.


Continue Klonopin to 1 mg by mouth at bedtime for anxiety/insomnia


Continue lithium 450 mg by mouth twice a day for stabilization


-When necessary Ativan and Haldol for agitation/aggression.


-NRT - nicotine patch


-SW on board for discharge planning.  Encouraged the patient to participate in 

milieu.

## 2023-07-12 VITALS — TEMPERATURE: 98.1 F | DIASTOLIC BLOOD PRESSURE: 69 MMHG | SYSTOLIC BLOOD PRESSURE: 116 MMHG | RESPIRATION RATE: 15 BRPM

## 2023-07-12 RX ADMIN — LITHIUM CARBONATE SCH MG: 450 TABLET, EXTENDED RELEASE ORAL at 09:30

## 2023-07-12 RX ADMIN — NICOTINE SCH PATCH: 14 PATCH, EXTENDED RELEASE TRANSDERMAL at 09:30

## 2023-07-12 NOTE — P.DS
Providers


Date of admission: 


07/07/23 16:04





Expected date of discharge: 07/12/23


Attending physician: 


Binu Stone MD





Consults: 





                                        





07/07/23 16:09


Consult Physician Routine 


   Consulting Provider: [facility]


   Consult Reason/Comments: H & P w/medical management and meds


   Do you want consulting provider notified?: Yes











Primary care physician: 


Stated None








- Discharge Diagnosis(es)


(1) Schizoaffective disorder, bipolar type


Current Visit: Yes   Status: Acute   Priority: High   


Hospital Course: 





Admission HPI:


Initial psychiatric evaluation was completed by Dr. Fagan on 07/08/2023 who 

wrote:


Patient is a 43-year-old -American male on disability who lives with his 

mom and her  is admitted for bizarre behavior





HPI: per chart, patient has had bizarre behavior.  Patient states that "my mom 

just want my money" he says that his mom only gave him less than half of his 

paycheck and when patient got upset about it, she sent him to the hospital.  He 

implies that mom frequently sends him to the [facility] hospital so she can steal 

money from him.  He states that he is  off a cartoon which has

had 3-4 episodes.  He also states that the cartoon is taking money from him.  He

states that he has many ideas, intent build [employer] tries to steal his ideas.  

Denies any suicidal thoughts, homicidal thoughts, or hallucinations. smokes half

a pack per day, and denies any other substance use recently.





PAST PSYCHIATRIC HISTORY: history of bipolar disorder with psychotic features.  

Was admitted in November for psychosis and kirk.  At that time, he was 

discharged on Invega Sustenna, Depakote 1500 at bedtime, and lithium 450 twice a

day. he reports multiple hospitalizations.  States that he follows up at [facility].  States that his current medications are Seroquel, Depakote, 

Cogentin, and Ambien. Per chart, he is on seroquel 300 mg qhs, depakote 1500 mg 

qhs, klonopin 2 mg qhs, and lithium 450 mg bid. States that he is on the Haldol 

shot.  States that he has been on Wellbutrin and Benadryl in the past Patient 

denies any history of suicide attempts in the past.





Hospital course:


Upon admission to the unit patient was initially presenting with paranoid and 

grandiose delusions. Patient was however directable and agreeable to commence 

treatment. Patient got along well with other patients on the unit and followed 

unit protocol.  Patient was compliant with the medications and denied any side 

effects throughout hospital course.  Patient was agreeable to signing himself 

formal voluntary on to the psychiatric unit.  Patient was started on Seroquel, 

Depakote, Klonopin, and lithium for mood stabilization..  Patient spoke of his 

stressors and engaged in therapy both group and individual.  Patient was also 

seen by medical team for history and physical exam.  Over the course of 

hospitalization, the patient displayed significant improvement in regards to 

start his symptoms of kirk.  However, it was determined that the patient was 

too somnolent in the morning and therefore his morning dose his medication was 

decreased and his nightly dose was increased.  The patient displayed significant

improvement with this medication change is much more alert and oriented however 

his manic symptoms continue to be controlled.  The patient was initially to be 

discharged on 07/11/2023 howeverdischarge plan was available as the patient's 

guardian was out of town.  The patient became quite upset and not being 

discharged and began ranting in a grandiose and labile fashion.  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  

Eventually, he was able to calm down.  On the day of discharge, the patient is 

not reporting any suicidal or homicidal ideation or plan.  He reports no access 

to firearms or weapons.  He is alert and oriented in all spheres and reports no 

issues regarding his sleep or his appetite.  He does not mention any overt 

grandiose or paranoid delusions to this provider.  He reports no racing 

thoughts, mood lability, or increased goal-directed activity.  He denies any 

auditory or visual hallucinations.  He remains calm and cooperative.  The 

patient does not have significant history of substance abuse however was couns

eled at great length from [facility] including alcohol, tobacco, marijuana, 

and all illicit drugs.  He has been in adherent throughout his hospital stay and

is not reporting any medical issues or concerns.  He denies any chest pain, 

Raphael's breath, palpitations, akathisia, or tardive dyskinesia.  As the patient 

no longer met criteria for continued inpatient psychiatric hospitalization, he 

was subsequently discharged after appropriate safety planning.








Mental status exam:


General Appearance: Patient appears to be stated age is alert, pleasant, and 

cooperative. Patient is in no acute distress and has fair hygiene and grooming 


Behavior: Patient is calmly seated without any agitated behavior.


Speech: Patient's speech is fluent and nonpressured. 


Mood/Affect: Patient reports their mood is "feeling good", affect is congruent 

and euthymic. 


Suicidality/Homicidality:  Patient denies having any suicidal or homicidal 

ideation intent or plan.  


Perceptions: Patient denies any auditory or visual hallucinations.  


Though content/process: There is no evidence of any delusional thought content 

and thought process is linear and goal-directed. Patient is future oriented.


Memory and concentration: AOX3, grossly intact for the purposes of this session.

Can spell "WORLD" backwards correctly.


Judgment and insight: Improved with guarded prognosis





Impression:


Schizoaffective disorder, bipolar type





Plan:


-Continue with discharge today as patient has improved and stabilized 

psychiatrically and is not currently an imminent threat to himself and/or 

others. Patient will remain at chronically elevated risk for harm to self and/or

others due to the severity and chronicity of his mental illness.


-Continue medications: 


Depakote ER 1500 mg by mouth at bedtime for mood stabilization


Klonopin 1 mg by mouth at bedtime for insomnia


Lithium 450 mg here twice a day for mood stabilization


Seroquel 400 mg by mouth at bedtime for mood stabilization/psychosis


-Patient was counseled on the need for medication compliance and appropriate 

follow-up at mental health and also primary care for medical issues.  Patient 

verbalized understanding and agreed.


-Social work to arrange for and conduct family meeting to ensure safety upon 

discharge and answer any questions/concerns. Social work also to arrange for 

patients follow up appointments with [facility] for psychiatric care along 

with follow up with primary care provider.


-Patient counseled on abstaining from recreational drugs and marijuana and 

alcohol. Was informed/educated on the adverse effects on their physical and 

mental health. Patient verbally agreed and understood. 


-Patient was instructed to return to the hospital or seek immediate medical care

if their psychiatric or medical symptoms do worsen or reoccur.


-Psychoeducation and supportive therapy provided to patient.  Risks and benefits

of pharmacological treatment versus the risks and benefits of nontreatment 

weighed and discussed.  Informed consent discussion held.  Common side effects 

of psychotropics discussed such as, but not limited to headache, GI disturbance,

sexual dysfunction, movement disorders, sedation, and orthostatic hypotension.  

Life threatening and blackbox warnings of prescribed medications also discussed.

 Potential risks of operating a vehicle or heavy machinery discussed with 

patient at length.  Advised on importance of compliance and a reliable and 

responsible manner. Patient advised to review FDA consumer labeling of all 

medications prior to taking.  Patient verbalized understanding of potential 

risks, and agrees with current treatment plan.  Patient advised to medically 

contact physician/emergency personnel if any acute changes in condition occur.








                                   Vital Signs











Temp  98.1 F   07/12/23 05:48


 


Pulse  106 H  07/12/23 05:48


 


Resp  15   07/12/23 05:48


 


BP  116/69   07/12/23 05:48


 


Pulse Ox  98   07/12/23 05:48


 


FiO2      











                               Laboratory Results











Sodium  137 mmol/L (137-145)   07/11/23  10:55    


 


Potassium  4.2 mmol/L (3.5-5.1)   07/11/23  10:55    


 


Chloride  103 mmol/L ()   07/11/23  10:55    


 


Carbon Dioxide  28 mmol/L (22-30)   07/11/23  10:55    


 


Anion Gap  6 mmol/L  07/11/23  10:55    


 


BUN  9 mg/dL (9-20)   07/11/23  10:55    


 


Creatinine  0.75 mg/dL (0.66-1.25)   07/11/23  10:55    


 


Est GFR (CKD-EPI)AfAm  >90  (>60 ml/min/1.73 sqM)   07/11/23  10:55    


 


Est GFR (CKD-EPI)NonAf  >90  (>60 ml/min/1.73 sqM)   07/11/23  10:55    


 


Glucose  92 mg/dL (74-99)   07/11/23  10:55    


 


Estimated Ave Glu mg/dL  123 mg/dL  07/08/23  10:42    


 


Hemoglobin A1c  5.9 % (<=6.0)   07/08/23  10:42    


 


Calcium  9.3 mg/dL (8.4-10.2)   07/11/23  10:55    


 


Total Bilirubin  0.2 mg/dL (0.2-1.3)   07/11/23  10:55    


 


AST  26 U/L (17-59)   07/11/23  10:55    


 


ALT  29 U/L (4-49)   07/11/23  10:55    


 


Alkaline Phosphatase  71 U/L ()   07/11/23  10:55    


 


Total Protein  6.7 g/dL (6.3-8.2)   07/11/23  10:55    


 


Albumin  3.8 g/dL (3.5-5.0)   07/11/23  10:55    


 


Triglycerides  223.00 mg/dL (0..00)  H  07/08/23  10:42    


 


Cholesterol  145.00 mg/dL (0..00)   07/08/23  10:42    


 


LDL Cholesterol, Calc  62.1 mg/dL (0.0-131.0)   07/08/23  10:42    


 


VLDL Cholesterol, Calc  44.60 mg/dL (5.00-40.00)  H  07/08/23  10:42    


 


HDL Cholesterol  38.30 mg/dL (40.00-60.00)  L  07/08/23  10:42    


 


Cholesterol/HDL Ratio  3.79 Ratio  07/08/23  10:42    


 


TSH  3.250 mIU/L (0.465-4.680)   07/08/23  10:42    


 


Valproic Acid  98.5 ug/mL  07/11/23  10:55    


 


Lithium  0.7 mmol/L  07/08/23  10:42    











                                    Allergies











Allergy/AdvReac Type Severity Reaction Status Date / Time


 


No Known Allergies Allergy   Verified 07/07/23 16:09











Patient Condition at Discharge: Stable





Plan - Discharge Summary


Discharge Rx Participant: Yes


New Discharge Prescriptions: 


New


   Divalproex ER [Depakote ER] 1,500 mg PO HS 30 Days #90 tab


   clonazePAM [KlonoPIN] 1 mg PO HS 30 Days #30 tab


   [place] Carbonate ER [Lithobid] 450 mg PO BID 30 Days #60 tab


   QUEtiapine [SEROquel] 400 mg PO HS 30 Days #60 tab





Discontinued


   Nicotine 14Mg/24Hr Patch [Habitrol] 1 patch TRANSDERM DAILY 30 Days  patch


   Paliperidone IM [Invega Sustenna] 156 mg IM ONCE #1 ml


   Divalproex ER [Depakote ER] 1,500 mg PO HS 30 Days  tab


   Lithium Carbonate ER [Lithobid] 450 mg PO BID 30 Days  tab


   QUEtiapine [SEROquel] 100 mg PO HS 7 Days #7 tablet


Discharge Medication List





Divalproex ER [Depakote ER] 1,500 mg PO HS 30 Days #90 tab 07/11/23 [Rx]


Lithium Carbonate ER [Lithobid] 450 mg PO BID 30 Days #60 tab 07/11/23 [Rx]


QUEtiapine [SEROquel] 400 mg PO HS 30 Days #60 tab 07/11/23 [Rx]


clonazePAM [KlonoPIN] 1 mg PO HS 30 Days #30 tab 07/11/23 [Rx]








Follow up Appointment(s)/Referral(s): 


Family, Medicine [Other] - 1 Week


Emely Telles [Other] - 07/13/23 9:00 am


Patient Instructions/Handouts:  Bipolar Disorder (DC)


Activity/Diet/Wound Care/Special Instructions: 


Avoid the use of street drugs and alcohol.  Take all medications as prescribed. 

 When you are in need of refills on your medications, please contact your 

medical provider and/or outpatient psychiatrist to have this done.  Please go to

 scheduled outpatient appointments for aftercare treatment.  If symptoms return 

or become worse, call the crisis line at [phone] and/or go to the nearest

 emergency room for evaluation. 


Discharge Disposition: HOME SELF-CARE